# Patient Record
Sex: FEMALE | Race: WHITE
[De-identification: names, ages, dates, MRNs, and addresses within clinical notes are randomized per-mention and may not be internally consistent; named-entity substitution may affect disease eponyms.]

---

## 2019-07-08 ENCOUNTER — HOSPITAL ENCOUNTER (EMERGENCY)
Dept: HOSPITAL 88 - FSED | Age: 74
Discharge: HOME | End: 2019-07-08
Payer: MEDICARE

## 2019-07-08 VITALS — HEIGHT: 66 IN | WEIGHT: 216 LBS | BODY MASS INDEX: 34.72 KG/M2

## 2019-07-08 VITALS — SYSTOLIC BLOOD PRESSURE: 149 MMHG | DIASTOLIC BLOOD PRESSURE: 80 MMHG

## 2019-07-08 DIAGNOSIS — S93.491A: ICD-10-CM

## 2019-07-08 DIAGNOSIS — I10: ICD-10-CM
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
## 2019-07-08 DIAGNOSIS — S83.422A: ICD-10-CM

## 2019-07-08 DIAGNOSIS — S83.412A: Primary | ICD-10-CM
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
## 2019-07-08 PROCEDURE — 99283 EMERGENCY DEPT VISIT LOW MDM: CPT

## 2019-07-08 NOTE — DIAGNOSTIC IMAGING REPORT
Exam:  Left Knee Series.



History: Trauma, knee pain



Comparison: None



Findings:  

2 views of the left knee demonstrate postoperative findings of left total knee

replacement with components in anatomic alignment. No acute fracture. No

evidence of hardware loosening. Small suprapatellar knee joint effusion.



Impression:

Status post left total knee replacement. No acute osseous injury.





Signed by: Minnie Colon MD on 7/8/2019 12:52 PM

## 2019-07-08 NOTE — DIAGNOSTIC IMAGING REPORT
PROCEDURE:ANKLE 3 VIEW RT - HOPD

 

COMPARISON:None.

 

INDICATIONS:s/p fall pain on lat side of ankle

 

FINDINGS:

 

The bones are diffusely osteopenic. No acute, displaced fracture or 

dislocation. Tibial plafond and talar dome are intact. The ankle 

mortise is maintained.

 

Degenerative plantar and posterior calcaneal spur. Atherosclerotic 

vascular calcifications and Achilles tendon calcifications.

 

CONCLUSION:

 

Diffuse osteopenia without acute osseous abnormality. 

 

Dictated by:  Sander Jones M.D. on 7/08/2019 at 12:45     

Electronically approved by:  Sander Jones M.D. on 7/08/2019 at 12:45

## 2019-07-08 NOTE — XMS REPORT
Clinical Summary

                             Created on: 2019



Zaida Mandujano

External Reference #: [id number]

: 1945

Sex: Female



Demographics







                          Address                   [address] [place]  [address]

 

                          Home Phone                [phone]

 

                          Preferred Language        English

 

                          Marital Status            Unknown

 

                          Rastafarian Affiliation     Voodoo

 

                          Race                      White

 

                          Ethnic Group              Non-





Author







                          Author                    MARINE Neur[employer]

 

                          [facility] [employer] [facility]

 

                          Address                   Unknown

 

                          Phone                     Unavailable







Support







                Name            Relationship    Address         Phone

 

                    Jd Mandujano     [employer]                [address] [place]  [address]                  [phone]

 

                    JohnyZhane          [employer]                [address] [place]  [address]                     [phone]







Care Team Providers







                    Care Team Member Name    Role                Phone

 

                    Estuardo Gilliland MD    PCP                 Unavailable







Allergies







                                        Comments



                 Active Allergy     Reactions       Severity        Noted Date 

 

                                        



Muscle pain any increased cpk



                           Pravastatin               2015 

 

                                        



Leg cramps. 



                     Statins-Hmg-Coa Reductase     Other (See          2014 



                           Inhibitors                Comments)   

 

                                         



                 Vancomycin Analogues     Anaphylaxis     High            2015 







Medications







                          End Date                  Status



              Medication     Sig          Dispensed     Refills      Start  



                                         Date  

 

                                                    Active



                     metFORMIN (GLUCOPHAGE)     Take 500 mg         0   



                           500 MG tablet             by mouth     



                                         daily with     



                                         breakfast .     

 

                                                    Active



                     fluticasone (FLONASE) 50     1 spray by          0   



                           mcg/actuation nasal spray     Nasal route     



                                         as needed .     

 

                                                    Active



                     lisinopril          Take 5 mg by        0   



                           (PRINIVIL,ZESTRIL) 5 MG     mouth daily.     



                                         tablet      

 

                                                    Active



                     amiodarone (PACERONE) 200     Take 200 mg         0   



                           MG tablet                 by mouth     



                                         every other     



                                         day Tales 1/2     



                                         tablet of 200     



                                         mg.     

 

                                                    Active



                     metoprolol (LOPRESSOR) 50     Take 50 mg by       0   



                           MG tablet                 mouth 2 (two)     



                                         times daily.     

 

                                                    Active



                     omega-3 fatty acids-fish     Take 2 g by         0   



                           oil 340-1,000 mg Cap per     mouth 2 (two)     



                           capsule                   times daily.     

 

                                                    Active



                     pantoprazole (PROTONIX)     Take 40 mg by       0   



                           40 MG tablet              mouth daily.     

 

                                                    Active



                     trospium 60 mg Cp24     Take by             0   



                                         mouth.     

 

                                                    Active



                     atorvastatin (LIPITOR) 40     Take 40 mg by       0   



                           MG tablet                 mouth twice a     



                                         week ON     



                                         Monday and     



                                         THURSDAY .     







Active Problems







 



                           Problem                   Noted Date

 

 



                           Osteoarthritis of right knee     2016

 

 



                           Right knee DJD            2016

 

 



                           Sinus tachycardia         2015

 

 



                           Acute pyelonephritis      2015

 

 



                           Type II or unspecified type diabetes mellitus without mention of     2014



                                         complication, not stated as uncontrolled 

 

 



                           Pure hypercholesterolemia     2014

 

 



                           Unspecified essential hypertension     2014

 

 



                           Arthritis                 2014

 

 



                           DJD (degenerative joint disease) of knee     2014

 

 



                           Hypotension---post propafol injection,     2014

 

 



                           HTN (hypertension), benign, Echo: Normal LVEF AND WM., (14)     2014

 

 



                           DM (diabetes mellitus)     2014

 

 



                           Hyperlipemia              2014

 

 



                           Senile cataract, unspecified     2012

 

 



                           Esophageal reflux         2011

 

 



                           Unspecified urinary incontinence     2011

 

 



                           Other specified disorders of adrenal glands     2010







Social History







                                        Date



                 Tobacco Use     Types           Packs/Day       Years Used 

 

                                         



                                         Never Smoker    

 

    



                                         Smokeless Tobacco: Never   



                                         Used   









   



                 Alcohol Use     Drinks/Week     oz/Week         Comments

 

   



                                         No   









 



                           Sex Assigned at Birth     Date Recorded

 

 



                                         Not on file 









                                        Industry



                           Job Start Date            Occupation 

 

                                        Not on file



                           Not on file               Not on file 









                                        Travel End



                           Travel History            Travel Start 

 





                                         No recent travel history available.







Last Filed Vital Signs

Not on file



Plan of Treatment





Not on file



Implants







                    Device Identifier    Shelf Expiration Date    Model / Serial / Lot



                 Implanted       Type            Area            Manufactur   



                                         er   

 

                                        2015          6192-1-010 /

 /

[id number]



                 Cement,Bone Simplex P Speedset Full     Cement/Ralph      Left: Knee      Senait   



                     Dose 40gm - [id number]     ler/Adhesi          Orthopaedi   



                     Implanted: Qty: 2 on 2014 by     Sander Grove MD      

 

                                        2018          6194-1-001 /

 /

                                        [id number]



                 Cement Bone Smplx Hv 6194-1-010 -     Cement/Ralph      Right: Knee     SENAIT:ST   



                     [id number]           ler/Adhesi          ALFREDO   



                     Implanted: Qty: 1 on 2016 by     Sander Rick MD          

 

                                        2018          6195-1-010 /

 /

                                        [id number]



                 Cement Bone Smplx  Hv W-Gentam     Cement/Ralph      Right: Knee     SENAIT:ST   



                     6195-1-010 - [id number]     ler/Adhesi          ALFREDO   



                     Implanted: Qty: 1 on 2016 by     Sander Rick MD          

 

                                        2019          5550-L-298 /

 /

[id number]



                 Patella,Triathlon Symmetric 29x8mm     Joints          Left: Knee      [place]   



                           - [id number]                Orthopaedi   



                           Implanted: Qty: 1 on 2014 by       Sander Machuca MD      

 

                                        2019          5510-F-401 /

 /

[id number]



                 Fem Comp,Triathlon Cr Cemented #4     Joints          Left: Knee      Cantonment   



                           Left - [id number]           Orthopaedi   



                           Implanted: Qty: 1 on 2014 by       Sander Machuca MD      

 

                                        2018          5520-B-400 /

 /

[id number]



                 Tibial Baseplate,Triathlon Prim     Joints          Left: Knee      Senait   



                           Cemented #4 - [id number]       Orthopaedi   



                           Implanted: Qty: 1 on 2014 by       Sander Machuca MD      

 

                                        2018          5530-P-409 /

 /

[id number]



                 Tibial Insert,Triathlon Cr #4 9mm -     Joints          Left: Knee      Senait   



                           [id number]                  Orthopaedi   



                           Implanted: Qty: 1 on 2014 by       Sander Machuca MD      

 

                                        10/23/2021          5520-B-400 /

 /

[id number]



                 Baseplt Tri Prim Tib 4 5520-B-400 -     Joints          Right: Knee     SENAIT:ST   



                           [id number]                 ALFREDO   



                           Implanted: Qty: 1 on 2016 by       Sander Ruiz MD          

 

                                        2021          5531-G-409 /

 /

[id number]



                 Insrt Tib Bearing #4 9mm X1 -     Joints          Right: Knee     SENAIT:ST   



                           [id number]                 ALFREDO   



                           Implanted: Qty: 1 on 2016 by       Sander Ruiz MD          

 

                                        2021          5510-F-402 /

 /

[id number]



                 Comp Femoral #4 R 5510-F-402 -     Joints          Right: Knee     SENAIT:ST   



                           [id number]                 ALFREDO   



                           Implanted: Qty: 1 on 2016 by       Sander Ruiz MD          

 

                                        2021          5550-L-298 /

 /

[id number]



                 Patella Tri Asymmetric 29x8mm     Joints          Right: Knee     SENAIT:ST   



                           5550-L-298 - [id number]       ALFREDO   



                           Implanted: Qty: 1 on 2016 by       Sander Ruiz MD          

 

                                        2017          [id number] /

 /

                                        [id number]



                 Stent,Uret F/G Contour Injection     Uro Stent       Left: Ureter     [place]   



                           6.0/24 - [id number]        SCIENTIFIC   



                                         Implanted: Qty: 1 on 2015 by      



                                         Sedrick Lopez MD      

 

                                        2018          [id number] /

 /

                                        [id number]



                     Stent,Uret Polaris Ultra [id number] -     Uro Stent           [place]   



                           [id number]                 SCIENTIFIC   



                                         Implanted: Qty: 1 on 10/01/2015 by      



                                         Abel Winston MD      







Results

Not on fileafter 2018



Insurance







     



            Payer      Benefit     Subscriber ID     Type       Phone      Address



                                         Plan /    



                                         Group    

 

     



                     [facility]          [address]   



                                         MEDICARE    



                                         ADV    









     



            Guarantor Name     Account     Relation to     Date of     Phone      Billing Address



                     Type                Patient             Birth  

 

     



            Mandujano,Zaida Gaviria     Personal/F     Self       1945     [phone]     [address] [place] [address]

 [address]               (Henry)              [place], [place]



                                         [address]







Advance Directives





For more information, please contact:



[address] [address] [phone]









                          Date Inactivated          Comments



                           Code Status               Date Activated  

 

                          2016  5:06 PM        



                           Full Code                 2016  8:22 AM  









  



                           This code status was determined by:     Patient 









                                                     

 

                          2015  1:54 PM         



                           Full Code                 2015 11:20 AM  









  



                           This code status was determined by:     Patient 









                                                     

 

                          5/15/2014 12:41 PM        All possible means of support, including: cardiac massage,

 mechanical ventilation, and defibrillation will be used to support life.



                           Code ONE                  2014  5:59 PM  









                                                     

 

                          2014  5:37 PM        All possible means of support including;cardiac massage, mechanical

 ventilation, and defibrillation will be used to support life.



                           Code ONE                  2014  8:42 AM